# Patient Record
Sex: MALE | Race: WHITE | Employment: OTHER | URBAN - METROPOLITAN AREA
[De-identification: names, ages, dates, MRNs, and addresses within clinical notes are randomized per-mention and may not be internally consistent; named-entity substitution may affect disease eponyms.]

---
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
## 2021-03-30 NOTE — ED TRIAGE NOTES
Patient arrives ambulatory to ED with c/c of sharp left sided rib pain when he touches it, onset a few months. Patient reports he was playing [employer] this evening and may have made it worse.
MODERATE

## 2022-02-19 ENCOUNTER — HOSPITAL ENCOUNTER (EMERGENCY)
Age: 22
Discharge: HOME | End: 2022-02-19
Attending: EMERGENCY MEDICINE

## 2022-02-19 ENCOUNTER — APPOINTMENT (OUTPATIENT)
Dept: GENERAL RADIOLOGY | Age: 22
End: 2022-02-19
Attending: EMERGENCY MEDICINE

## 2022-02-19 VITALS
TEMPERATURE: 98 F | SYSTOLIC BLOOD PRESSURE: 121 MMHG | HEART RATE: 97 BPM | HEIGHT: 70 IN | WEIGHT: 174 LBS | DIASTOLIC BLOOD PRESSURE: 71 MMHG | BODY MASS INDEX: 24.91 KG/M2 | OXYGEN SATURATION: 98 % | RESPIRATION RATE: 16 BRPM

## 2022-02-19 DIAGNOSIS — R07.89 ATYPICAL CHEST PAIN: Primary | ICD-10-CM

## 2022-02-19 DIAGNOSIS — F17.200 SMOKER: ICD-10-CM

## 2022-02-19 LAB
ALBUMIN SERPL-MCNC: 4.4 G/DL (ref 3.4–5)
ALBUMIN/GLOB SERPL: 1.4 {RATIO} (ref 0.8–1.7)
ALP SERPL-CCNC: 90 U/L (ref 45–117)
ALT SERPL-CCNC: 37 U/L (ref 16–61)
ANION GAP SERPL CALC-SCNC: 3 MMOL/L (ref 3–18)
AST SERPL-CCNC: 19 U/L (ref 10–38)
BASOPHILS # BLD: 0 K/UL (ref 0–0.1)
BASOPHILS NFR BLD: 0 % (ref 0–2)
BILIRUB SERPL-MCNC: 0.6 MG/DL (ref 0.2–1)
BUN SERPL-MCNC: 13 MG/DL (ref 7–18)
BUN/CREAT SERPL: 13 (ref 12–20)
CALCIUM SERPL-MCNC: 9.2 MG/DL (ref 8.5–10.1)
CHLORIDE SERPL-SCNC: 106 MMOL/L (ref 100–111)
CO2 SERPL-SCNC: 31 MMOL/L (ref 21–32)
CREAT SERPL-MCNC: 0.97 MG/DL (ref 0.6–1.3)
DIFFERENTIAL METHOD BLD: ABNORMAL
EOSINOPHIL # BLD: 0.1 K/UL (ref 0–0.4)
EOSINOPHIL NFR BLD: 1 % (ref 0–5)
ERYTHROCYTE [DISTWIDTH] IN BLOOD BY AUTOMATED COUNT: 12.6 % (ref 11.6–14.5)
GLOBULIN SER CALC-MCNC: 3.1 G/DL (ref 2–4)
GLUCOSE SERPL-MCNC: 100 MG/DL (ref 74–99)
HCT VFR BLD AUTO: 42.4 % (ref 36–48)
HGB BLD-MCNC: 14.4 G/DL (ref 13–16)
IMM GRANULOCYTES # BLD AUTO: 0 K/UL (ref 0–0.04)
IMM GRANULOCYTES NFR BLD AUTO: 0 % (ref 0–0.5)
LYMPHOCYTES # BLD: 1.5 K/UL (ref 0.9–3.6)
LYMPHOCYTES NFR BLD: 26 % (ref 21–52)
MCH RBC QN AUTO: 29.6 PG (ref 24–34)
MCHC RBC AUTO-ENTMCNC: 34 G/DL (ref 31–37)
MCV RBC AUTO: 87.1 FL (ref 78–100)
MONOCYTES # BLD: 0.4 K/UL (ref 0.05–1.2)
MONOCYTES NFR BLD: 8 % (ref 3–10)
NEUTS SEG # BLD: 3.7 K/UL (ref 1.8–8)
NEUTS SEG NFR BLD: 65 % (ref 40–73)
NRBC # BLD: 0 K/UL (ref 0–0.01)
NRBC BLD-RTO: 0 PER 100 WBC
PLATELET # BLD AUTO: 265 K/UL (ref 135–420)
PMV BLD AUTO: 8.6 FL (ref 9.2–11.8)
POTASSIUM SERPL-SCNC: 3.6 MMOL/L (ref 3.5–5.5)
PROT SERPL-MCNC: 7.5 G/DL (ref 6.4–8.2)
RBC # BLD AUTO: 4.87 M/UL (ref 4.35–5.65)
SODIUM SERPL-SCNC: 140 MMOL/L (ref 136–145)
TROPONIN-HIGH SENSITIVITY: 4 NG/L (ref 0–78)
WBC # BLD AUTO: 5.7 K/UL (ref 4.6–13.2)

## 2022-02-19 PROCEDURE — 99283 EMERGENCY DEPT VISIT LOW MDM: CPT

## 2022-02-19 PROCEDURE — 80053 COMPREHEN METABOLIC PANEL: CPT

## 2022-02-19 PROCEDURE — 85025 COMPLETE CBC W/AUTO DIFF WBC: CPT

## 2022-02-19 PROCEDURE — 93005 ELECTROCARDIOGRAM TRACING: CPT

## 2022-02-19 PROCEDURE — 71045 X-RAY EXAM CHEST 1 VIEW: CPT

## 2022-02-19 PROCEDURE — 84484 ASSAY OF TROPONIN QUANT: CPT

## 2022-02-19 NOTE — ED PROVIDER NOTES
EMERGENCY DEPARTMENT HISTORY AND PHYSICAL EXAM    Date: 2/19/2022  Patient Name: Julián Baptiste    History of Presenting Illness     Chief Complaint   Patient presents with    Chest Pain    Shortness of Breath         History Provided By: Patient    6:53 PM  Julián Baptiste is a 25 y.o. male with PMHX of anxiety, ADHD who presents to the emergency department C/O left-sided chest pain associated with deep inspiration or certain movements, also sometimes feels like he cannot take in a full deep breath. He feels fine right now. Patient states he vapes nicotine and smokes marijuana heavily every day. Occasional EtOH, denies other drugs. No personal or family history of cardiac or pulmonary disease. Pt denies fever, cough, congestion, sore throat, nausea, vomiting, recent long distance travel, history of DVT/PE, calf pain or swelling and any other sxs or complaints. PCP: None    Current Outpatient Medications   Medication Sig Dispense Refill    ibuprofen (MOTRIN) 600 mg tablet Take 1 Tab by mouth every six (6) hours as needed for Pain. Take with food. (Patient not taking: Reported on 2/19/2022) 20 Tab 0    cloNIDine HCL (CATAPRES) 0.1 mg tablet Take  by mouth nightly. (Patient not taking: Reported on 2/19/2022)      methylphenidate HCl (RITALIN) 20 mg tablet Take 20 mg by mouth. (Patient not taking: Reported on 2/19/2022)      sertraline (ZOLOFT) 100 mg tablet Take 150 mg by mouth. (Patient not taking: Reported on 2/19/2022)         Past History     Past Medical History:  Past Medical History:   Diagnosis Date    ADD (attention deficit disorder)     ADHD     Anxiety     Nicotine vapor product user        Past Surgical History:  Past Surgical History:   Procedure Laterality Date    HX ORTHOPAEDIC      right arm       Family History:  History reviewed. No pertinent family history.     Social History:  Social History     Tobacco Use    Smoking status: Never Smoker    Smokeless tobacco: Current User  Tobacco comment: vape   Substance Use Topics    Alcohol use: Yes    Drug use: Yes     Types: Marijuana       Allergies:  No Known Allergies      Review of Systems   Review of Systems   Constitutional: Negative for fever. Respiratory: Positive for shortness of breath. Negative for cough. Cardiovascular: Positive for chest pain. Gastrointestinal: Negative for abdominal pain, nausea and vomiting. All other systems reviewed and are negative. Physical Exam     Vitals:    02/19/22 1722   BP: 121/71   Pulse: 97   Resp: 16   Temp: 98 °F (36.7 °C)   SpO2: 98%   Weight: 78.9 kg (174 lb)   Height: 5' 10\" (1.778 m)     Physical Exam    Vital signs and nursing notes reviewed. CONSTITUTIONAL: Alert. Well-appearing; well-nourished; in no apparent distress. HEAD: Normocephalic; atraumatic. EYES:  Conjunctiva clear. ENT:  Moist mucus membranes. NECK: Supple; FROM without difficulty, non-tender; no cervical lymphadenopathy. CV: Normal S1, S2; no murmurs, rubs, or gallops. No chest wall tenderness. RESPIRATORY: Normal chest excursion with respiration; breath sounds clear and equal bilaterally; no wheezes, rhonchi, or rales. EXT: Normal ROM in all four extremities; non-tender to palpation. No edema or calf tenderness  SKIN: Normal for age and race; warm; dry; good turgor; no apparent lesions or exudate. NEURO: A & O x3. PSYCH:  Mood and affect appropriate. Diagnostic Study Results     Labs -     No results found for this or any previous visit (from the past 12 hour(s)).     Radiologic Studies -   XR CHEST SNGL V   Final Result   No acute process        CT Results  (Last 48 hours)    None        CXR Results  (Last 48 hours)               02/19/22 1743  XR CHEST SNGL V Final result    Impression:  No acute process       Narrative:  EXAM:  AP Portable Chest X-ray 1 view        INDICATION: Shortness of breath       COMPARISON: December 26, 2020       _______________       FINDINGS: Heart and mediastinal contours are within normal limits for portable   radiograph. Lungs are clear of active disease. There are no pleural effusions. No acute osseous findings. ________________                     Medications given in the ED-  Medications - No data to display      Medical Decision Making   I am the first provider for this patient. I reviewed the vital signs, available nursing notes, past medical history, past surgical history, family history and social history. Vital Signs-Reviewed the patient's vital signs. Pulse Oximetry Analysis - 98% on RA       EKG interpretation: (Preliminary)  NSR with sinus arrhythmia, rate 73, no STEMI    Records Reviewed: Nursing Notes      Procedures:  Procedures    ED Course:  6:53 PM   Initial assessment performed. The patients presenting problems have been discussed, and they are in agreement with the care plan formulated and outlined with them. I have encouraged them to ask questions as they arise throughout their visit. Provider Notes (Medical Decision Making): Alena Hadley is a 25 y.o. male presents with a few days of left-sided chest pain with deep inspiration or certain movements. No injury or trauma. Patient is a heavy smoker of both nicotine and marijuana. He appears in no distress, vitals and exam are normal.  He is actually pain-free at this time. His EKG is without ischemic changes labs including troponin are normal.  He is PERC negative with 0 Wells criteria for PE. CXR shows NAP. Patient symptoms are likely related to heavy smoking, possibly pleurisy component. I discussed smoking cessation with him, at least decrease.  consult placed to assist with PCP follow-up as he does not currently have a doctor. Diagnosis and Disposition       DISCHARGE NOTE:    Isabel Medellin's  results have been reviewed with him. He has been counseled regarding his diagnosis, treatment, and plan.   He verbally conveys understanding and agreement of the signs, symptoms, diagnosis, treatment and prognosis and additionally agrees to follow up as discussed. He also agrees with the care-plan and conveys that all of his questions have been answered. I have also provided discharge instructions for him that include: educational information regarding their diagnosis and treatment, and list of reasons why they would want to return to the ED prior to their follow-up appointment, should his condition change. He has been provided with education for proper emergency department utilization. CLINICAL IMPRESSION:    1. Atypical chest pain    2. Smoker        PLAN:  1. D/C Home  2. Discharge Medication List as of 2/19/2022  7:18 PM        3. Follow-up Information     Follow up With Specialties Details Why Contact Info    Jacy Ahn MD Family Medicine Schedule an appointment as soon as possible for a visit   [address] [place] [id number] [phone]      [facility] EMERGENCY DEPT Emergency Medicine  As needed, If symptoms worsen [address] [place] [address]  [phone]        _______________________________      Please note that this dictation was completed with [employer], the computer voice recognition software. Quite often unanticipated grammatical, syntax, homophones, and other interpretive errors are inadvertently transcribed by the computer software. Please disregard these errors. Please excuse any errors that have escaped final proofreading.

## 2022-02-20 LAB
ATRIAL RATE: 73 BPM
CALCULATED P AXIS, ECG09: 13 DEGREES
CALCULATED R AXIS, ECG10: 73 DEGREES
CALCULATED T AXIS, ECG11: 38 DEGREES
DIAGNOSIS, 93000: NORMAL
P-R INTERVAL, ECG05: 172 MS
Q-T INTERVAL, ECG07: 366 MS
QRS DURATION, ECG06: 92 MS
QTC CALCULATION (BEZET), ECG08: 403 MS
VENTRICULAR RATE, ECG03: 73 BPM

## 2022-03-06 ENCOUNTER — HOSPITAL ENCOUNTER (EMERGENCY)
Age: 22
Discharge: HOME | End: 2022-03-06
Attending: EMERGENCY MEDICINE

## 2022-03-06 ENCOUNTER — APPOINTMENT (OUTPATIENT)
Dept: GENERAL RADIOLOGY | Age: 22
End: 2022-03-06
Attending: PHYSICIAN ASSISTANT

## 2022-03-06 VITALS
BODY MASS INDEX: 24.91 KG/M2 | HEART RATE: 105 BPM | OXYGEN SATURATION: 97 % | SYSTOLIC BLOOD PRESSURE: 136 MMHG | RESPIRATION RATE: 16 BRPM | DIASTOLIC BLOOD PRESSURE: 79 MMHG | WEIGHT: 174 LBS | HEIGHT: 70 IN | TEMPERATURE: 96.9 F

## 2022-03-06 DIAGNOSIS — Z20.822 PERSON UNDER INVESTIGATION FOR COVID-19: ICD-10-CM

## 2022-03-06 DIAGNOSIS — J20.9 ACUTE BRONCHITIS WITH BRONCHOSPASM: Primary | ICD-10-CM

## 2022-03-06 LAB
ALBUMIN SERPL-MCNC: 4.2 G/DL (ref 3.4–5)
ALBUMIN/GLOB SERPL: 1.2 {RATIO} (ref 0.8–1.7)
ALP SERPL-CCNC: 108 U/L (ref 45–117)
ALT SERPL-CCNC: 40 U/L (ref 16–61)
ANION GAP SERPL CALC-SCNC: 5 MMOL/L (ref 3–18)
AST SERPL-CCNC: 23 U/L (ref 10–38)
ATRIAL RATE: 86 BPM
BASOPHILS # BLD: 0 K/UL (ref 0–0.1)
BASOPHILS NFR BLD: 1 % (ref 0–2)
BILIRUB SERPL-MCNC: 0.8 MG/DL (ref 0.2–1)
BNP SERPL-MCNC: 15 PG/ML (ref 0–450)
BUN SERPL-MCNC: 7 MG/DL (ref 7–18)
BUN/CREAT SERPL: 8 (ref 12–20)
CALCIUM SERPL-MCNC: 9.1 MG/DL (ref 8.5–10.1)
CALCULATED P AXIS, ECG09: 29 DEGREES
CALCULATED R AXIS, ECG10: 70 DEGREES
CALCULATED T AXIS, ECG11: 41 DEGREES
CHLORIDE SERPL-SCNC: 104 MMOL/L (ref 100–111)
CO2 SERPL-SCNC: 30 MMOL/L (ref 21–32)
CREAT SERPL-MCNC: 0.9 MG/DL (ref 0.6–1.3)
D DIMER PPP FEU-MCNC: <0.27 UG/ML(FEU)
DIAGNOSIS, 93000: NORMAL
DIFFERENTIAL METHOD BLD: ABNORMAL
EOSINOPHIL # BLD: 0.1 K/UL (ref 0–0.4)
EOSINOPHIL NFR BLD: 1 % (ref 0–5)
ERYTHROCYTE [DISTWIDTH] IN BLOOD BY AUTOMATED COUNT: 12.9 % (ref 11.6–14.5)
GLOBULIN SER CALC-MCNC: 3.5 G/DL (ref 2–4)
GLUCOSE SERPL-MCNC: 103 MG/DL (ref 74–99)
HCT VFR BLD AUTO: 46.1 % (ref 36–48)
HGB BLD-MCNC: 15.6 G/DL (ref 13–16)
IMM GRANULOCYTES # BLD AUTO: 0 K/UL (ref 0–0.04)
IMM GRANULOCYTES NFR BLD AUTO: 0 % (ref 0–0.5)
LYMPHOCYTES # BLD: 1 K/UL (ref 0.9–3.6)
LYMPHOCYTES NFR BLD: 15 % (ref 21–52)
MCH RBC QN AUTO: 29.1 PG (ref 24–34)
MCHC RBC AUTO-ENTMCNC: 33.8 G/DL (ref 31–37)
MCV RBC AUTO: 86 FL (ref 78–100)
MONOCYTES # BLD: 1.2 K/UL (ref 0.05–1.2)
MONOCYTES NFR BLD: 18 % (ref 3–10)
NEUTS SEG # BLD: 4.1 K/UL (ref 1.8–8)
NEUTS SEG NFR BLD: 65 % (ref 40–73)
NRBC # BLD: 0 K/UL (ref 0–0.01)
NRBC BLD-RTO: 0 PER 100 WBC
P-R INTERVAL, ECG05: 152 MS
PLATELET # BLD AUTO: 227 K/UL (ref 135–420)
PMV BLD AUTO: 8.8 FL (ref 9.2–11.8)
POTASSIUM SERPL-SCNC: 3.8 MMOL/L (ref 3.5–5.5)
PROT SERPL-MCNC: 7.7 G/DL (ref 6.4–8.2)
Q-T INTERVAL, ECG07: 350 MS
QRS DURATION, ECG06: 88 MS
QTC CALCULATION (BEZET), ECG08: 418 MS
RBC # BLD AUTO: 5.36 M/UL (ref 4.35–5.65)
SARS-COV-2, COV2: NORMAL
SODIUM SERPL-SCNC: 139 MMOL/L (ref 136–145)
TROPONIN-HIGH SENSITIVITY: <3 NG/L (ref 0–78)
VENTRICULAR RATE, ECG03: 86 BPM
WBC # BLD AUTO: 6.4 K/UL (ref 4.6–13.2)

## 2022-03-06 PROCEDURE — 80053 COMPREHEN METABOLIC PANEL: CPT

## 2022-03-06 PROCEDURE — 71045 X-RAY EXAM CHEST 1 VIEW: CPT

## 2022-03-06 PROCEDURE — 85379 FIBRIN DEGRADATION QUANT: CPT

## 2022-03-06 PROCEDURE — U0003 INFECTIOUS AGENT DETECTION BY NUCLEIC ACID (DNA OR RNA); SEVERE ACUTE RESPIRATORY SYNDROME CORONAVIRUS 2 (SARS-COV-2) (CORONAVIRUS DISEASE [COVID-19]), AMPLIFIED PROBE TECHNIQUE, MAKING USE OF HIGH THROUGHPUT TECHNOLOGIES AS DESCRIBED BY CMS-2020-01-R: HCPCS

## 2022-03-06 PROCEDURE — 93005 ELECTROCARDIOGRAM TRACING: CPT

## 2022-03-06 PROCEDURE — 85025 COMPLETE CBC W/AUTO DIFF WBC: CPT

## 2022-03-06 PROCEDURE — 84484 ASSAY OF TROPONIN QUANT: CPT

## 2022-03-06 PROCEDURE — 99285 EMERGENCY DEPT VISIT HI MDM: CPT

## 2022-03-06 PROCEDURE — 83880 ASSAY OF NATRIURETIC PEPTIDE: CPT

## 2022-03-06 RX ORDER — BENZONATATE 100 MG/1
100 CAPSULE ORAL
Qty: 21 CAPSULE | Refills: 0 | Status: SHIPPED | OUTPATIENT
Start: 2022-03-06 | End: 2022-03-13

## 2022-03-06 RX ORDER — ALBUTEROL SULFATE 90 UG/1
2 AEROSOL, METERED RESPIRATORY (INHALATION)
Qty: 1 EACH | Refills: 0 | Status: SHIPPED | OUTPATIENT
Start: 2022-03-06

## 2022-03-06 NOTE — Clinical Note
[facility] [place]  THE [facility] EMERGENCY DEPT  [address] [facility] [address] [phone]    Work/School Note    Date: 3/6/2022     To Whom It May concern:    Clifford Williamson was evaluated by the following provider(s):  Attending Provider: Alexis Schrader MD  Physician Assistant: Deysi West virus is suspected. Per the CDC guidelines we recommend home isolation until the following conditions are all met:    1. At least five days have passed since symptoms first appeared and/or had a close exposure,   2. After home isolation for five days, wearing a mask around others for the next five days,  3. At least 24 have passed since last fever without the use of fever-reducing medications and  4.  Symptoms (eg cough, shortness of breath) have improved      Sincerely,          Stephanie Franklin PA-C

## 2022-03-06 NOTE — ED PROVIDER NOTES
EMERGENCY DEPARTMENT HISTORY AND PHYSICAL EXAM    Date: 3/6/2022  Patient Name: Evonne Harris    History of Presenting Illness     Chief Complaint   Patient presents with    Chest Congestion         History Provided By: Patient    Chief Complaint: chest congestion    HPI(Context):   1:47 PM  Evonne Harris is a 25 y.o. male with PMHX of ADHD, anxiety, nicotine vape use who presents to the emergency department C/O chest congestion. Associated sxs include SOB and cough. Sxs x 4 days. Pt notes sxs improving. No COVID exposures. Pt is vaccinated. Pt denies fever, chills, CP, and any other sxs or complaints. PCP: None    Current Outpatient Medications   Medication Sig Dispense Refill    benzonatate (Tessalon Perles) 100 mg capsule Take 1 Capsule by mouth three (3) times daily as needed for Cough for up to 7 days. 21 Capsule 0    albuterol (PROVENTIL HFA, VENTOLIN HFA, PROAIR HFA) 90 mcg/actuation inhaler Take 2 Puffs by inhalation every four (4) hours as needed for Wheezing or Shortness of Breath. 1 Each 0    inhalational spacing device 1 Each by Does Not Apply route as needed (to be used with albuterol HFA.). Please dispense one adult spacer 1 Each 0       Past History     Past Medical History:  Past Medical History:   Diagnosis Date    ADD (attention deficit disorder)     ADHD     Anxiety     Nicotine vapor product user        Past Surgical History:  Past Surgical History:   Procedure Laterality Date    HX ORTHOPAEDIC      right arm       Family History:  No family history on file. Social History:  Social History     Tobacco Use    Smoking status: Never Smoker    Smokeless tobacco: Current User    Tobacco comment: vape   Substance Use Topics    Alcohol use: Yes    Drug use: Yes     Types: Marijuana       Allergies:  No Known Allergies      Review of Systems   Review of Systems   Constitutional: Negative for chills and fever. HENT: Positive for congestion.     Respiratory: Positive for cough and shortness of breath. Cardiovascular: Negative for chest pain and leg swelling. Musculoskeletal: Negative for myalgias. All other systems reviewed and are negative. Physical Exam     Vitals:    03/06/22 1341   BP: 136/79   Pulse: (!) 105   Resp: 16   Temp: 96.9 °F (36.1 °C)   SpO2: 97%   Weight: 78.9 kg (174 lb)   Height: 5' 10\" (1.778 m)     Physical Exam  Vitals and nursing note reviewed. Constitutional:       General: He is not in acute distress. Appearance: He is well-developed. He is not diaphoretic. Comments: Adult male in NAD. Alert. HENT:      Head: Normocephalic and atraumatic. Right Ear: External ear normal.      Left Ear: External ear normal.      Nose: Congestion present. Eyes:      General: No scleral icterus. Right eye: No discharge. Left eye: No discharge. Conjunctiva/sclera: Conjunctivae normal.   Cardiovascular:      Rate and Rhythm: Normal rate and regular rhythm. Heart sounds: Normal heart sounds. No murmur heard. No friction rub. No gallop. Pulmonary:      Effort: Pulmonary effort is normal. No tachypnea, accessory muscle usage or respiratory distress. Breath sounds: Normal breath sounds. No decreased breath sounds, wheezing, rhonchi or rales. Musculoskeletal:         General: Normal range of motion. Cervical back: Normal range of motion. Right lower leg: No edema. Left lower leg: No edema. Skin:     General: Skin is warm and dry. Neurological:      Mental Status: He is alert and oriented to person, place, and time. Psychiatric:         Judgment: Judgment normal.             Diagnostic Study Results     Labs -   No results found for this or any previous visit (from the past 12 hour(s)).     Radiologic Studies     XR CHEST PORT   Final Result   No acute process        CT Results  (Last 48 hours)    None        CXR Results  (Last 48 hours)               03/06/22 1436  XR CHEST PORT Final result Impression:  No acute process       Narrative:  EXAM:  AP Portable Chest X-ray 1 view        INDICATION: Cough and chest pain shortness of breath       COMPARISON: February 19, 2022       _______________       FINDINGS:  Heart and mediastinal contours are within normal limits for portable   radiograph. Lungs are clear of active disease. There are no pleural effusions. No acute osseous findings. ________________                     Medications given in the ED-  Medications - No data to display      Medical Decision Making   I am the first provider for this patient. I reviewed the vital signs, available nursing notes, past medical history, past surgical history, family history and social history. Vital Signs-Reviewed the patient's vital signs. Pulse Oximetry Analysis - 97% on RA. NORMAL     Records Reviewed: Nursing Notes and Old Medical Records    Provider Notes (Medical Decision Making): PNA, URI, COVID, CHF, asthma/RAD, allergic    Procedures:  Procedures    ED Course:   1:47 PM Initial assessment performed. The patients presenting problems have been discussed, and they are in agreement with the care plan formulated and outlined with them. I have encouraged them to ask questions as they arise throughout their visit. Diagnosis and Disposition       Afebrile. Lungs CTAB. No hypoxia. CXR clear. Labs unremarkable. DDimer neg. Suspect viral process. Will instruct isolation and await SARS-COV-2. Reasons to RTED discussed with pt. All questions answered. Pt feels comfortable going home at this time. Pt expressed understanding and he agrees with plan. 1. Acute bronchitis with bronchospasm    2. Person under investigation for COVID-19        PLAN:  1. D/C Home  2. Discharge Medication List as of 3/6/2022  3:46 PM      START taking these medications    Details   benzonatate (Tessalon Perles) 100 mg capsule Take 1 Capsule by mouth three (3) times daily as needed for Cough for up to 7 days. , Normal, Disp-21 Capsule, R-0      albuterol (PROVENTIL HFA, VENTOLIN HFA, PROAIR HFA) 90 mcg/actuation inhaler Take 2 Puffs by inhalation every four (4) hours as needed for Wheezing or Shortness of Breath., Normal, Disp-1 Each, R-0      inhalational spacing device 1 Each by Does Not Apply route as needed (to be used with albuterol HFA.). Please dispense one adult spacer, Normal, Disp-1 Each, R-0         STOP taking these medications       ibuprofen (MOTRIN) 600 mg tablet Comments:   Reason for Stopping:         cloNIDine HCL (CATAPRES) 0.1 mg tablet Comments:   Reason for Stopping:         methylphenidate HCl (RITALIN) 20 mg tablet Comments:   Reason for Stopping:         sertraline (ZOLOFT) 100 mg tablet Comments:   Reason for Stopping:             3.   Follow-up Information     Follow up With Specialties Details Why Contact Info    your PCP        [facility] EM[facility] DE[facility] Emergency Medicine  As needed, If symptoms worsen [address] [address]  [phone]        _______________________________    Attestations: This note is prepared by Len Allen PA-C.  _______________________________          Please note that this dictation was completed with [employer], the computer voice recognition software. Quite often unanticipated grammatical, syntax, homophones, and other interpretive errors are inadvertently transcribed by the computer software. Please disregard these errors. Please excuse any errors that have escaped final proofreading.

## 2022-03-06 NOTE — ED TRIAGE NOTES
Pt arrives ambulatory to ED with c\o cough, chest congestion x 4 days, pt sts \"I'm better than I was yesterday\"

## 2022-03-07 ENCOUNTER — PATIENT OUTREACH (OUTPATIENT)
Dept: CASE MANAGEMENT | Age: 22
End: 2022-03-07

## 2022-03-07 LAB — SARS-COV-2, NAA: NOT DETECTED

## 2022-03-07 NOTE — PROGRESS NOTES
Ambulatory Care Coordination ED COVID Follow up Call    Challenges to be reviewed by the provider   Additional needs identified to be addressed with provider no  none           Encounter was not routed to provider for escalation. Method of communication with provider : none    Discussed COVID-19 related testing which was pending at this time. Test results were pending. Patient informed of results, if available? pending. Current Symptoms: no new symptoms and no worsening symptoms    Reviewed New or Changed Meds: yes    Do you have what you need at home?  Durable Medical Equipment ordered at discharge: None   Home Health/Outpatient orders at discharge: none    Pulse oximeter? no Discussed and confirmed pulse oximeter discharge instructions and when to notify provider or seek emergency care. Patient education provided: Reviewed appropriate site of care based on symptoms and resources available to patient including: when to seek medical attention. Follow up appointment recommended: no. If no appointment scheduled, scheduling offered: no.  No future appointments. Interventions: Obtained and reviewed discharge summary and/or continuity of care documents  Reviewed discharge instructions, medical action plan and red flags with patient who verbalized understanding. Provided contact information for future needs. Plan for follow-up call in 1-2 days based on severity of symptoms and risk factors.   Plan for next call: follow up test results     Jamie Barajas LPN

## 2022-03-08 ENCOUNTER — PATIENT OUTREACH (OUTPATIENT)
Dept: CASE MANAGEMENT | Age: 22
End: 2022-03-08

## 2022-03-08 NOTE — PROGRESS NOTES
Follow Up Call    Challenges to be reviewed by the provider   Additional needs identified to be addressed with provider: no  none           Encounter was not routed to provider for escalation. Method of communication with provider: none. Contacted the patient by telephone to follow up after ED. Status: improved Covid test negative  Interventions to address identified needs: none at this time    [facility] follow up appointment(s): No future appointments. [facility] follow up appointment(s): as needed   Follow up appointment completed? n/a. Provided contact information for future needs. Plan for follow-up call in 5-7 days based on severity of symptoms and risk factors.   Plan for next call: follow up close     Vishal Harris LPN

## 2022-03-15 ENCOUNTER — PATIENT OUTREACH (OUTPATIENT)
Dept: CASE MANAGEMENT | Age: 22
End: 2022-03-15

## 2022-03-15 NOTE — PROGRESS NOTES
Patient resolved from [address] Transitions episode on 3/15/2022. Patient states he is doing fine. Patient currently reports that the following symptoms have improved:  no worsening symptoms. No further outreach scheduled with this LPN CC. Episode of Care resolved. Patient has this LPN CC contact information if future needs arise.

## 2022-03-18 PROBLEM — R00.2 PALPITATIONS: Status: ACTIVE | Noted: 2020-07-22

## 2022-03-19 PROBLEM — L55.1 SUNBURN OF SECOND DEGREE: Status: ACTIVE | Noted: 2020-07-22

## 2023-04-27 ENCOUNTER — HOSPITAL ENCOUNTER (EMERGENCY)
Facility: HOSPITAL | Age: 23
Discharge: HOME | End: 2023-04-27
Attending: EMERGENCY MEDICINE

## 2023-04-27 VITALS
TEMPERATURE: 97.1 F | HEIGHT: 70 IN | RESPIRATION RATE: 18 BRPM | SYSTOLIC BLOOD PRESSURE: 146 MMHG | DIASTOLIC BLOOD PRESSURE: 89 MMHG | WEIGHT: 165 LBS | BODY MASS INDEX: 23.62 KG/M2 | HEART RATE: 82 BPM | OXYGEN SATURATION: 98 %

## 2023-04-27 DIAGNOSIS — L55.9 SUNBURN: Primary | ICD-10-CM

## 2023-04-27 PROCEDURE — 99283 EMERGENCY DEPT VISIT LOW MDM: CPT

## 2023-04-27 RX ORDER — IBUPROFEN 600 MG/1
600 TABLET ORAL 4 TIMES DAILY PRN
Qty: 360 TABLET | Refills: 1 | Status: SHIPPED | OUTPATIENT
Start: 2023-04-27

## 2023-04-27 RX ORDER — HYDROXYZINE HYDROCHLORIDE 25 MG/1
50 TABLET, FILM COATED ORAL 3 TIMES DAILY PRN
Qty: 21 TABLET | Refills: 0 | Status: SHIPPED | OUTPATIENT
Start: 2023-04-27 | End: 2023-05-04

## 2023-04-27 NOTE — ED PROVIDER NOTES
[facility] EMERGENCY DEPT  EMERGENCY DEPARTMENT ENCOUNTER       Pt Name: Brett Gaytan  MRN: [id number]  [place] 2000  Date of evaluation: 4/27/2023  PCP: None None  Note Started: 1:22 AM 4/27/23     CHIEF COMPLAINT       Chief Complaint   Patient presents with    Sunburn        HISTORY OF PRESENT ILLNESS: 1 or more elements      History From: Patient  HPI Limitations: None  Chronic Conditions: ADHD, anxeity, ADD  Social Determinants affecting Dx or Tx: none      Brett Gaytan is a 26 y.o. male who presents to ED c/o sunburn. Pt c/o itching and peeling to back. Pt was out at beach 5 days ago. Not wearing SPF. Pt has used lotions and benadryl without relief. No reported nausea vomiting     Nursing Notes were all reviewed and agreed with or any disagreements were addressed in the HPI. PAST HISTORY     Past Medical History:  Past Medical History:   Diagnosis Date    ADD (attention deficit disorder)     ADHD     Anxiety     Nicotine vapor product user        Past Surgical History:  Past Surgical History:   Procedure Laterality Date    ORTHOPEDIC SURGERY      right arm       Family History:  No family history on file. Social History:  Social History     Socioeconomic History    Marital status: Single   Tobacco Use    Smoking status: Never    Smokeless tobacco: Current    Tobacco comments:     Quit smoking: vape   Substance and Sexual Activity    Alcohol use: Yes    Drug use: Yes     Types: Marijuana (Weed)       Allergies:  No Known Allergies    CURRENT MEDICATIONS      No current facility-administered medications for this encounter.      Current Outpatient Medications   Medication Sig Dispense Refill    ibuprofen (ADVIL;MOTRIN) 600 MG tablet Take 1 tablet by mouth 4 times daily as needed for Pain 360 tablet 1    hydrOXYzine HCl (ATARAX) 25 MG tablet Take 2 tablets by mouth 3 times daily as needed for Itching 21 tablet 0    albuterol sulfate HFA (PROVENTIL;VENTOLIN;PROAIR) 108 (90 Base) MCG/ACT inhaler Inhale

## 2023-05-05 ENCOUNTER — HOSPITAL ENCOUNTER (EMERGENCY)
Facility: HOSPITAL | Age: 23
Discharge: HOME | End: 2023-05-05
Attending: EMERGENCY MEDICINE

## 2023-05-05 VITALS
HEART RATE: 72 BPM | BODY MASS INDEX: 21.47 KG/M2 | TEMPERATURE: 97.6 F | HEIGHT: 70 IN | SYSTOLIC BLOOD PRESSURE: 123 MMHG | DIASTOLIC BLOOD PRESSURE: 73 MMHG | WEIGHT: 150 LBS | RESPIRATION RATE: 16 BRPM | OXYGEN SATURATION: 100 %

## 2023-05-05 DIAGNOSIS — M79.2 NERVE PAIN: ICD-10-CM

## 2023-05-05 DIAGNOSIS — M54.14 THORACIC NERVE ROOT IMPINGEMENT: Primary | ICD-10-CM

## 2023-05-05 DIAGNOSIS — K08.89 PAIN, DENTAL: ICD-10-CM

## 2023-05-05 PROCEDURE — 99283 EMERGENCY DEPT VISIT LOW MDM: CPT | Performed by: PHYSICIAN ASSISTANT

## 2023-05-05 RX ORDER — METHYLPREDNISOLONE 4 MG/1
TABLET ORAL
Qty: 1 KIT | Refills: 0 | Status: SHIPPED | OUTPATIENT
Start: 2023-05-05

## 2023-05-05 RX ORDER — AMOXICILLIN 500 MG/1
500 TABLET, FILM COATED ORAL 2 TIMES DAILY
Qty: 20 TABLET | Refills: 0 | Status: SHIPPED | OUTPATIENT
Start: 2023-05-05 | End: 2023-05-15

## 2023-05-05 ASSESSMENT — PAIN SCALES - GENERAL: PAINLEVEL_OUTOF10: 10

## 2023-05-05 ASSESSMENT — PAIN - FUNCTIONAL ASSESSMENT: PAIN_FUNCTIONAL_ASSESSMENT: 0-10

## 2023-05-05 NOTE — DISCHARGE INSTRUCTIONS
Look up wall angels exercises for back pain relief. Take medications as prescribed. Make appointment with dentist and focus on posture.

## 2023-05-05 NOTE — ED PROVIDER NOTES
[facility] EMERGENCY DEPT  EMERGENCY DEPARTMENT ENCOUNTER       Pt Name: Feli Bashir  MRN: [id number]  [place] 2000  Date of evaluation: 5/5/2023  Provider: Cece Torres PA-C   PCP: None None  Note Started: 2:41 PM 5/5/23     CHIEF COMPLAINT       Chief Complaint   Patient presents with    Back Pain    Dental Pain        HISTORY OF PRESENT ILLNESS: 1 or more elements      History From: Patient  HPI Limitations: None     Feli Bashir is a 26 y.o. male who presents to the emergency department with a chief complaint of right sided thoracic back pain onset 1 week ago. Patient describes the sensation as a sharp tingling sensation of the area as well as an itchiness to the skin. He does report getting sunburned 2 weeks ago to his back. He was given hydroxyzine for his sunburn but he states that the medication has not helped. He is also tried taking ibuprofen without much relief. He does admit to poor posture for several years. He is also complaining of dental pain to the left lower area which is generally chronic for him and he has known dental decay. He is scheduled to see the dentist in the next couple of weeks. No trouble swallowing, fever, injury, heavy lifting. Nursing Notes were all reviewed and agreed with or any disagreements were addressed in the HPI. PAST HISTORY     Past Medical History:  Past Medical History:   Diagnosis Date    ADD (attention deficit disorder)     ADHD     Anxiety     Nicotine vapor product user        Past Surgical History:  Past Surgical History:   Procedure Laterality Date    ORTHOPEDIC SURGERY      right arm       Family History:  History reviewed. No pertinent family history.     Social History:  Social History     Socioeconomic History    Marital status: Single     Spouse name: None    Number of children: None    Years of education: None    Highest education level: None   Tobacco Use    Smoking status: Never    Smokeless tobacco: Current    Tobacco comments:

## 2023-05-23 ENCOUNTER — HOSPITAL ENCOUNTER (EMERGENCY)
Facility: HOSPITAL | Age: 23
Discharge: HOME | End: 2023-05-23
Attending: EMERGENCY MEDICINE

## 2023-05-23 VITALS
TEMPERATURE: 97 F | HEART RATE: 70 BPM | SYSTOLIC BLOOD PRESSURE: 134 MMHG | BODY MASS INDEX: 22.9 KG/M2 | WEIGHT: 160 LBS | OXYGEN SATURATION: 99 % | RESPIRATION RATE: 18 BRPM | HEIGHT: 70 IN | DIASTOLIC BLOOD PRESSURE: 88 MMHG

## 2023-05-23 DIAGNOSIS — K02.9 PAIN DUE TO DENTAL CARIES: Primary | ICD-10-CM

## 2023-05-23 DIAGNOSIS — S02.5XXA CLOSED FRACTURE OF TOOTH, INITIAL ENCOUNTER: ICD-10-CM

## 2023-05-23 PROCEDURE — 99283 EMERGENCY DEPT VISIT LOW MDM: CPT

## 2023-05-23 PROCEDURE — 6370000000 HC RX 637 (ALT 250 FOR IP): Performed by: PHYSICIAN ASSISTANT

## 2023-05-23 RX ORDER — PENICILLIN V POTASSIUM 500 MG/1
500 TABLET ORAL 4 TIMES DAILY
Qty: 28 TABLET | Refills: 0 | Status: SHIPPED | OUTPATIENT
Start: 2023-05-23 | End: 2023-05-30

## 2023-05-23 RX ORDER — DIPHENHYDRAMINE HCL 12.5MG/5ML
25 LIQUID (ML) ORAL ONCE
Status: COMPLETED | OUTPATIENT
Start: 2023-05-23 | End: 2023-05-23

## 2023-05-23 RX ADMIN — DIPHENHYDRAMINE HYDROCHLORIDE 25 MG: 25 SOLUTION ORAL at 01:40

## 2023-05-23 RX ADMIN — BENZOCAINE, BUTAMBEN, AND TETRACAINE HYDROCHLORIDE 1 SPRAY: .028; .004; .004 AEROSOL, SPRAY TOPICAL at 01:40

## 2023-05-23 ASSESSMENT — PAIN - FUNCTIONAL ASSESSMENT: PAIN_FUNCTIONAL_ASSESSMENT: 0-10

## 2023-05-23 ASSESSMENT — PAIN SCALES - GENERAL: PAINLEVEL_OUTOF10: 10

## 2023-05-23 NOTE — DISCHARGE INSTRUCTIONS
Finish antibiotics as prescribed, do not take more than 600 mg of ibuprofen every 6-8 hours, you can use \"dental balls\" for pain as needed.   Make an appointment with your dentist.  Return to the ER for any fever or facial swelling

## 2023-05-23 NOTE — ED TRIAGE NOTES
Patient to ED for reports of LEFT tooth and jaw pain.  Patient states he was seen for similar symptoms a few months ago but is worried that the infection has spread to his jaw

## 2023-05-23 NOTE — ED NOTES
Patient given discharge papers. Patient understanding of discharge.  Patient ambulatory out of ED       Thai Johns RN  05/23/23 [id number]

## 2023-05-23 NOTE — ED PROVIDER NOTES
[facility] EMERGENCY DEPT  EMERGENCY DEPARTMENT ENCOUNTER       Pt Name: Alfredo Davis  MRN: [id number]  [place] 2000  Date of evaluation: 5/23/2023  Provider: Reanna Nova PA-C   PCP: None None  Note Started: 1:10 AM 5/23/23     CHIEF COMPLAINT       Chief Complaint   Patient presents with    Dental Pain    Jaw Pain        HISTORY OF PRESENT ILLNESS: 1 or more elements      History From: Patient  HPI Limitations: None     Alfredo Davis is a 26 y.o. male who presents to the emergency department with a chief complaint of left lower dental pain, intermittent/chronic, with redness of his gums. Patient reports that his pain radiates into the left lower jaw. Patient has known dental caries and fractured teeth of the left lower molars. He states that he canceled his last dental appointment but knows that he needs to follow-up with a dentist and plans to reschedule his appointment tomorrow. His last round of antibiotics was 2 months ago. Patient has been taking ibuprofen for the pain. He denies facial swelling, fever, chills, trouble swallowing, drainage from the area, bleeding. Nursing Notes were all reviewed and agreed with or any disagreements were addressed in the HPI. PAST HISTORY     Past Medical History:  Past Medical History:   Diagnosis Date    ADD (attention deficit disorder)     ADHD     Anxiety     Nicotine vapor product user        Past Surgical History:  Past Surgical History:   Procedure Laterality Date    ORTHOPEDIC SURGERY      right arm       Family History:  History reviewed. No pertinent family history. Social History:  Social History     Socioeconomic History    Marital status: Single     Spouse name: None    Number of children: None    Years of education: None    Highest education level: None   Tobacco Use    Smoking status: Never    Smokeless tobacco: Current    Tobacco comments:     Quit smoking: vape   Substance and Sexual Activity    Alcohol use: Yes    Drug use:  Yes

## 2023-07-10 VITALS
RESPIRATION RATE: 16 BRPM | SYSTOLIC BLOOD PRESSURE: 134 MMHG | HEIGHT: 70 IN | WEIGHT: 165 LBS | OXYGEN SATURATION: 99 % | HEART RATE: 95 BPM | DIASTOLIC BLOOD PRESSURE: 76 MMHG | BODY MASS INDEX: 23.62 KG/M2 | TEMPERATURE: 98.2 F

## 2023-07-10 ASSESSMENT — PAIN DESCRIPTION - DESCRIPTORS: DESCRIPTORS: ACHING

## 2023-07-10 ASSESSMENT — PAIN DESCRIPTION - ORIENTATION: ORIENTATION: LEFT

## 2023-07-10 ASSESSMENT — PAIN - FUNCTIONAL ASSESSMENT: PAIN_FUNCTIONAL_ASSESSMENT: 0-10

## 2023-07-10 ASSESSMENT — PAIN SCALES - GENERAL: PAINLEVEL_OUTOF10: 8

## 2023-07-10 ASSESSMENT — PAIN DESCRIPTION - LOCATION: LOCATION: TEETH

## 2023-07-11 ENCOUNTER — HOSPITAL ENCOUNTER (EMERGENCY)
Facility: HOSPITAL | Age: 23
Discharge: LEFT BEFORE DISCHARGE | End: 2023-07-11

## 2023-07-11 ENCOUNTER — HOSPITAL ENCOUNTER (EMERGENCY)
Facility: HOSPITAL | Age: 23
Discharge: HOME | End: 2023-07-11

## 2023-07-11 VITALS
DIASTOLIC BLOOD PRESSURE: 92 MMHG | HEART RATE: 84 BPM | OXYGEN SATURATION: 99 % | RESPIRATION RATE: 15 BRPM | SYSTOLIC BLOOD PRESSURE: 146 MMHG | TEMPERATURE: 97.9 F

## 2023-07-11 DIAGNOSIS — Z72.0 NICOTINE VAPOR PRODUCT USER: ICD-10-CM

## 2023-07-11 DIAGNOSIS — J02.9 ACUTE PHARYNGITIS, UNSPECIFIED ETIOLOGY: ICD-10-CM

## 2023-07-11 DIAGNOSIS — R51.9 RIGHT FACIAL PAIN: Primary | ICD-10-CM

## 2023-07-11 PROCEDURE — 99283 EMERGENCY DEPT VISIT LOW MDM: CPT

## 2023-07-11 RX ORDER — KETOROLAC TROMETHAMINE 10 MG/1
10 TABLET, FILM COATED ORAL EVERY 6 HOURS PRN
Qty: 12 TABLET | Refills: 0 | Status: SHIPPED | OUTPATIENT
Start: 2023-07-11

## 2023-07-11 RX ORDER — AMOXICILLIN 500 MG/1
500 CAPSULE ORAL 2 TIMES DAILY
Qty: 20 CAPSULE | Refills: 0 | Status: SHIPPED | OUTPATIENT
Start: 2023-07-11 | End: 2023-07-21

## 2023-07-11 NOTE — DISCHARGE INSTRUCTIONS
DENTAL CLINICS FOR FOLLOW UP:    Dr. Amarilis Brenner, DDS   [address], [facility]  [address]:  [address] [facility], [address]  [phone]  Blu Tapia, and Extractions    [facility], [address]  [phone]  Blu Tapia, and Extractions    [address] [facility]  [address], [facility]  [phone]  Fillings, Cleaning, and [address] [facility]  [address], [address] [facility]  [phone]    [facility] CEDE[facility]  [facility] [address], [address] [facility]  [phone]  Ages 3-18, if attending [facility]  [address] [facility]  [phone]  Extractions, Rad[place], [facility]    [facility]  [address], [address]  [phone]  Oral Surgery - $70 required  Blu Tapia, Extractions - $100 Required    [facility]   [address] [facility] [address] [facility], [address] [facility]  [phone]  [facility] Only    [address] and Juarez Mueller, [facility] [address]  Dental Clinic Open September to June

## 2023-07-11 NOTE — ED PROVIDER NOTES
[facility] EMERGENCY DEPT  EMERGENCY DEPARTMENT ENCOUNTER       Pt Name: Larry Girard  MRN: [id number]  [address] 2000  Date of evaluation: 7/11/2023  PCP: None None  Note Started: 4:46 PM 7/11/23     CHIEF COMPLAINT       Chief Complaint   Patient presents with    Sore Throat    Dental Pain        HISTORY OF PRESENT ILLNESS: 1 or more elements      History From: Patient  HPI Limitations: None  Chronic Conditions: ADD, anxiety, marijuana use, nicotine vape use. Social Determinants affecting Dx or Tx: none      Larry Girard is a 26 y.o. male who presents to ED c/o left lower dental pain. Pain is aching with some swelling. Pt also notes sore throat. Sxs x 2 days. Pt has not seen dentist. He notes hx of problems with this tooth. No fever, cough, congestion. Pt is active marijuana user and vapes nicotine     Nursing Notes were all reviewed and agreed with or any disagreements were addressed in the HPI. PAST HISTORY     Past Medical History:  Past Medical History:   Diagnosis Date    ADD (attention deficit disorder)     ADHD     Anxiety     Nicotine vapor product user        Past Surgical History:  Past Surgical History:   Procedure Laterality Date    ORTHOPEDIC SURGERY      right arm       Family History:  No family history on file. Social History:  Social History     Socioeconomic History    Marital status: Single   Tobacco Use    Smoking status: Never    Smokeless tobacco: Current    Tobacco comments:     Quit smoking: vape   Substance and Sexual Activity    Alcohol use: Yes    Drug use: Yes     Types: Marijuana (Weed)       Allergies:  No Known Allergies    CURRENT MEDICATIONS      No current facility-administered medications for this encounter. Current Outpatient Medications   Medication Sig Dispense Refill    ketorolac (TORADOL) 10 MG tablet Take 1 tablet by mouth every 6 hours as needed for Pain Take with food. Do NOT take with other NSAIDs (ibuprofen, naproxen).  12 tablet 0    amoxicillin

## 2023-07-11 NOTE — ED NOTES
Pt has been called many times with no response. Pt no longer in waiting room.       Tanna Bonilla RN  07/11/23 4947

## 2023-07-11 NOTE — ED TRIAGE NOTES
Pt arrives alert + oriented reports dental pain x \"a few months\"   Pt states he has been seen here for it before hasnt f/u with dentist.

## 2023-10-11 ENCOUNTER — APPOINTMENT (OUTPATIENT)
Facility: HOSPITAL | Age: 23
End: 2023-10-11

## 2023-10-11 ENCOUNTER — HOSPITAL ENCOUNTER (EMERGENCY)
Facility: HOSPITAL | Age: 23
Discharge: HOME | End: 2023-10-11

## 2023-10-11 VITALS
RESPIRATION RATE: 22 BRPM | HEIGHT: 69 IN | WEIGHT: 174 LBS | TEMPERATURE: 98.3 F | HEART RATE: 100 BPM | BODY MASS INDEX: 25.77 KG/M2 | OXYGEN SATURATION: 99 % | DIASTOLIC BLOOD PRESSURE: 99 MMHG | SYSTOLIC BLOOD PRESSURE: 135 MMHG

## 2023-10-11 DIAGNOSIS — S69.91XA INJURY OF RIGHT HAND, INITIAL ENCOUNTER: ICD-10-CM

## 2023-10-11 DIAGNOSIS — S60.221A CONTUSION OF RIGHT HAND, INITIAL ENCOUNTER: Primary | ICD-10-CM

## 2023-10-11 DIAGNOSIS — Z23 TETANUS-DIPHTHERIA (TD) VACCINATION: ICD-10-CM

## 2023-10-11 DIAGNOSIS — R03.0 ELEVATED BLOOD PRESSURE READING IN OFFICE WITHOUT DIAGNOSIS OF HYPERTENSION: ICD-10-CM

## 2023-10-11 PROCEDURE — 90471 IMMUNIZATION ADMIN: CPT | Performed by: NURSE PRACTITIONER

## 2023-10-11 PROCEDURE — 99284 EMERGENCY DEPT VISIT MOD MDM: CPT

## 2023-10-11 PROCEDURE — 90714 TD VACC NO PRESV 7 YRS+ IM: CPT | Performed by: NURSE PRACTITIONER

## 2023-10-11 PROCEDURE — 73130 X-RAY EXAM OF HAND: CPT

## 2023-10-11 PROCEDURE — 6360000002 HC RX W HCPCS: Performed by: NURSE PRACTITIONER

## 2023-10-11 PROCEDURE — 6370000000 HC RX 637 (ALT 250 FOR IP): Performed by: NURSE PRACTITIONER

## 2023-10-11 RX ORDER — ACETAMINOPHEN 500 MG
1000 TABLET ORAL
Status: COMPLETED | OUTPATIENT
Start: 2023-10-11 | End: 2023-10-11

## 2023-10-11 RX ADMIN — CLOSTRIDIUM TETANI TOXOID ANTIGEN (FORMALDEHYDE INACTIVATED) AND CORYNEBACTERIUM DIPHTHERIAE TOXOID ANTIGEN (FORMALDEHYDE INACTIVATED) 0.5 ML: 5; 2 INJECTION, SUSPENSION INTRAMUSCULAR at 21:27

## 2023-10-11 RX ADMIN — ACETAMINOPHEN 1000 MG: 500 TABLET ORAL at 21:21

## 2023-10-11 ASSESSMENT — PAIN SCALES - GENERAL
PAINLEVEL_OUTOF10: 6
PAINLEVEL_OUTOF10: 9

## 2023-10-11 ASSESSMENT — PAIN - FUNCTIONAL ASSESSMENT: PAIN_FUNCTIONAL_ASSESSMENT: 0-10
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
## 2023-10-12 NOTE — DISCHARGE INSTRUCTIONS
RICE care as discussed  Use Ace wrap to affected extremity  Elevate as much as possible  May use ice up to 20 minutes at a time, do not place ice directly on skin  May use OTC Tylenol or ibuprofen according to package directions for pain  Up with PCM/Ortho if pain does not improve in 1 to 2 weeks  Return to care for new or worsening symptoms to include pale or cool extremity, loss of sensation (loosen Ace wrap first and see if this resolves), marked increase in swelling or other concerns

## 2023-11-23 ENCOUNTER — HOSPITAL ENCOUNTER (EMERGENCY)
Facility: HOSPITAL | Age: 23
Discharge: LEFT BEFORE DISCHARGE | End: 2023-11-24
Attending: EMERGENCY MEDICINE

## 2023-11-23 VITALS
OXYGEN SATURATION: 98 % | TEMPERATURE: 97.4 F | HEART RATE: 78 BPM | DIASTOLIC BLOOD PRESSURE: 85 MMHG | BODY MASS INDEX: 24.77 KG/M2 | SYSTOLIC BLOOD PRESSURE: 127 MMHG | RESPIRATION RATE: 16 BRPM | HEIGHT: 70 IN | WEIGHT: 173 LBS

## 2023-11-23 ASSESSMENT — PAIN - FUNCTIONAL ASSESSMENT: PAIN_FUNCTIONAL_ASSESSMENT: 0-10

## 2023-11-23 ASSESSMENT — PAIN DESCRIPTION - ORIENTATION: ORIENTATION: UPPER

## 2023-11-23 ASSESSMENT — PAIN DESCRIPTION - LOCATION: LOCATION: TEETH

## 2023-11-23 ASSESSMENT — PAIN SCALES - GENERAL: PAINLEVEL_OUTOF10: 8

## 2023-11-24 ENCOUNTER — HOSPITAL ENCOUNTER (EMERGENCY)
Facility: HOSPITAL | Age: 23
Discharge: HOME | End: 2023-11-24
Attending: EMERGENCY MEDICINE

## 2023-11-24 VITALS
OXYGEN SATURATION: 100 % | HEART RATE: 97 BPM | TEMPERATURE: 98 F | WEIGHT: 174 LBS | RESPIRATION RATE: 18 BRPM | BODY MASS INDEX: 24.97 KG/M2 | DIASTOLIC BLOOD PRESSURE: 81 MMHG | SYSTOLIC BLOOD PRESSURE: 118 MMHG

## 2023-11-24 DIAGNOSIS — K04.7 DENTAL INFECTION: Primary | ICD-10-CM

## 2023-11-24 PROCEDURE — 99283 EMERGENCY DEPT VISIT LOW MDM: CPT

## 2023-11-24 RX ORDER — IBUPROFEN 800 MG/1
800 TABLET ORAL EVERY 8 HOURS PRN
Qty: 30 TABLET | Refills: 0 | Status: SHIPPED | OUTPATIENT
Start: 2023-11-24

## 2023-11-24 RX ORDER — AMOXICILLIN AND CLAVULANATE POTASSIUM 875; 125 MG/1; MG/1
1 TABLET, FILM COATED ORAL 2 TIMES DAILY
Qty: 20 TABLET | Refills: 0 | Status: SHIPPED | OUTPATIENT
Start: 2023-11-24 | End: 2023-12-04

## 2023-11-24 RX ORDER — ACETAMINOPHEN 500 MG
1000 TABLET ORAL EVERY 6 HOURS PRN
Qty: 30 TABLET | Refills: 0 | Status: SHIPPED | OUTPATIENT
Start: 2023-11-24

## 2023-11-24 ASSESSMENT — PAIN - FUNCTIONAL ASSESSMENT: PAIN_FUNCTIONAL_ASSESSMENT: 0-10

## 2023-11-24 NOTE — ED PROVIDER NOTES
REVIEWED: Nursing Notes    Is this patient to be included in the SEP-1 core measure? No Exclusion criteria - the patient is NOT to be included for SEP-1 Core Measure due to: 2+ SIRS criteria are not met    MEDICATIONS ADMINISTERED IN THE ED:  Medications - No data to display         None    Medical Decision Making     SCREENING TOOLS:  None    CLINICAL MANAGEMENT TOOLS:  Not Applicable    DDX: Dental infection, dental pain    DISCUSSION:  This appears to be a moderate condition. This appears to be an acute condition. 26 y.o. male with upper dental swelling. In evaluation of the above differential diagnosis, consideration was given to the following tests and treatments. Likely dental infection, will place on antibiotics and pain medications. He can follow-up with his dentist, call Monday for an appointment. The decision to perform testing and results were discussed with the patient. I discussed each of these tests and considerations with the patient. They agree with the plan of discharge. ADDITIONAL CONSIDERATIONS:  None    Critical Care Time:     None    Raúl Morales MD    Diagnosis and Disposition     DISPOSITION Decision To Discharge 11/24/2023 10:30:17 AM    DISCHARGE NOTE:  Temi Fournier's  results have been reviewed with him. He has been counseled regarding his diagnosis, treatment, and plan. He verbally conveys understanding and agreement of the signs, symptoms, diagnosis, treatment and prognosis and additionally agrees to follow up as discussed. He also agrees with the care-plan and conveys that all of his questions have been answered. I have also provided discharge instructions for him that include: educational information regarding their diagnosis and treatment, and list of reasons why they would want to return to the ED prior to their follow-up appointment, should his condition change. He has been provided with education for proper emergency department utilization.      CLINICAL

## 2023-11-24 NOTE — DISCHARGE INSTRUCTIONS
Take all antibiotics as prescribed. Contact your dentist as soon as possible for follow-up. Return to the ED for worsening symptoms or for other concerns. You may additionally use ice packs for pain control. Tylenol and ibuprofen can be taken at the same time for pain.

## 2023-11-24 NOTE — ED NOTES
Resting in room. No changes in condition. Advised pt MD has clicked on chart.       Vivek Swanson RN  11/24/23 2788

## 2024-05-01 ENCOUNTER — APPOINTMENT (OUTPATIENT)
Facility: HOSPITAL | Age: 24
End: 2024-05-01

## 2024-05-01 ENCOUNTER — HOSPITAL ENCOUNTER (EMERGENCY)
Facility: HOSPITAL | Age: 24
Discharge: HOME | End: 2024-05-01
Attending: EMERGENCY MEDICINE

## 2024-05-01 VITALS
SYSTOLIC BLOOD PRESSURE: 134 MMHG | HEART RATE: 88 BPM | WEIGHT: 168 LBS | RESPIRATION RATE: 16 BRPM | DIASTOLIC BLOOD PRESSURE: 74 MMHG | OXYGEN SATURATION: 97 % | HEIGHT: 70 IN | TEMPERATURE: 97.6 F | BODY MASS INDEX: 24.05 KG/M2
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
## 2024-05-01 DIAGNOSIS — S20.211A RIB CONTUSION, RIGHT, INITIAL ENCOUNTER: ICD-10-CM

## 2024-05-01 DIAGNOSIS — M25.511 ACUTE PAIN OF RIGHT SHOULDER: ICD-10-CM

## 2024-05-01 PROCEDURE — 71101 X-RAY EXAM UNILAT RIBS/CHEST: CPT

## 2024-05-01 PROCEDURE — 99283 EMERGENCY DEPT VISIT LOW MDM: CPT

## 2024-05-01 PROCEDURE — 73030 X-RAY EXAM OF SHOULDER: CPT

## 2024-05-01 RX ORDER — METHOCARBAMOL 750 MG/1
750 TABLET, FILM COATED ORAL 4 TIMES DAILY
Qty: 24 TABLET | Refills: 0 | Status: SHIPPED | OUTPATIENT
Start: 2024-05-01 | End: 2024-05-07

## 2024-05-01 RX ORDER — IBUPROFEN 800 MG/1
800 TABLET ORAL EVERY 8 HOURS PRN
Qty: 30 TABLET | Refills: 0 | Status: SHIPPED | OUTPATIENT
Start: 2024-05-01

## 2024-05-01 ASSESSMENT — PAIN DESCRIPTION - LOCATION: LOCATION: SHOULDER;RIB CAGE

## 2024-05-01 ASSESSMENT — PAIN - FUNCTIONAL ASSESSMENT: PAIN_FUNCTIONAL_ASSESSMENT: 0-10

## 2024-05-01 ASSESSMENT — PAIN SCALES - GENERAL: PAINLEVEL_OUTOF10: 8

## 2024-05-01 NOTE — ED TRIAGE NOTES
Patient reports it was dark and missed step and fell down 13 steps last night. Reports has rright shoulder and neck pain and right rib pain. States it hurts to take a deep breath

## 2024-05-01 NOTE — ED PROVIDER NOTES
[facility] EMERGENCY DEPT  EMERGENCY DEPARTMENT ENCOUNTER    Patient Name: Darío Fournier  MRN: [id number]  YOB: 2000  Provider: Steve Mathew MD  PCP: None, None (Inactive)   Time/Date of evaluation: 1:35 PM EDT on 5/1/24    History of Presenting Illness     Chief Complaint   Patient presents with    Fall    Shoulder Pain    Rib Pain (injury)       History Provided by: Patient   History is limited by: Nothing    HISTORY (Narative):   Darío Fournier is a 24 y.o. male with a PMHX of ADHD  who presents to the emergency department (room 8) by POV C/O nonsyncopal fall down 13 steps onset last night.  Patient states that he missed a step in the dark and fell down 13 steps.  Associated sxs include right shoulder pain, right rib pain, neck pain, pleurisy. Patient denies LOC, blood thinner use or any other sxs or complaints.  C-collar placed in triage.    Nursing Notes were all reviewed and agreed with or any disagreements were addressed in the HPI.    Past History     PAST MEDICAL HISTORY:  Past Medical History:   Diagnosis Date    ADD (attention deficit disorder)     ADHD     Anxiety     Nicotine vapor product user        PAST SURGICAL HISTORY:  Past Surgical History:   Procedure Laterality Date    ORTHOPEDIC SURGERY      right arm       FAMILY HISTORY:  No family history on file.    SOCIAL HISTORY:  Social History     Tobacco Use    Smoking status: Never    Smokeless tobacco: Never    Tobacco comments:     Quit smoking: vape   Vaping Use    Vaping Use: Every day   Substance Use Topics    Alcohol use: Not Currently    Drug use: Yes     Types: Marijuana (Weed)     Comment: daily       MEDICATIONS:  No current facility-administered medications for this encounter.     Current Outpatient Medications   Medication Sig Dispense Refill    ibuprofen (ADVIL;MOTRIN) 800 MG tablet Take 1 tablet by mouth every 8 hours as needed for Pain (Patient not taking: Reported on 5/1/2024) 30 tablet 0    acetaminophen (TYLENOL)

## 2025-02-09 ENCOUNTER — APPOINTMENT (OUTPATIENT)
Facility: HOSPITAL | Age: 25
End: 2025-02-09

## 2025-02-09 ENCOUNTER — HOSPITAL ENCOUNTER (EMERGENCY)
Facility: HOSPITAL | Age: 25
Discharge: HOME | End: 2025-02-09

## 2025-02-09 VITALS
RESPIRATION RATE: 16 BRPM | HEIGHT: 70 IN | TEMPERATURE: 98.4 F | BODY MASS INDEX: 26.48 KG/M2 | HEART RATE: 86 BPM | SYSTOLIC BLOOD PRESSURE: 133 MMHG | WEIGHT: 185 LBS | DIASTOLIC BLOOD PRESSURE: 86 MMHG | OXYGEN SATURATION: 100 %

## 2025-02-09 DIAGNOSIS — J06.9 VIRAL URI WITH COUGH: Primary | ICD-10-CM

## 2025-02-09 LAB
FLUAV RNA SPEC QL NAA+PROBE: NOT DETECTED
FLUBV RNA SPEC QL NAA+PROBE: NOT DETECTED
SARS-COV-2 RNA RESP QL NAA+PROBE: NOT DETECTED
SOURCE: NORMAL

## 2025-02-09 PROCEDURE — 99284 EMERGENCY DEPT VISIT MOD MDM: CPT

## 2025-02-09 PROCEDURE — 71046 X-RAY EXAM CHEST 2 VIEWS: CPT

## 2025-02-09 PROCEDURE — 87636 SARSCOV2 & INF A&B AMP PRB: CPT

## 2025-02-09 PROCEDURE — 6360000002 HC RX W HCPCS: Performed by: NURSE PRACTITIONER

## 2025-02-09 PROCEDURE — 6370000000 HC RX 637 (ALT 250 FOR IP): Performed by: NURSE PRACTITIONER

## 2025-02-09 RX ORDER — DEXAMETHASONE 4 MG/1
8 TABLET ORAL
Status: COMPLETED | OUTPATIENT
Start: 2025-02-09 | End: 2025-02-09

## 2025-02-09 RX ORDER — IBUPROFEN 600 MG/1
600 TABLET, FILM COATED ORAL
Status: COMPLETED | OUTPATIENT
Start: 2025-02-09 | End: 2025-02-09

## 2025-02-09 RX ADMIN — IBUPROFEN 600 MG: 600 TABLET, FILM COATED ORAL at 15:32

## 2025-02-09 RX ADMIN — DEXAMETHASONE 8 MG: 4 TABLET ORAL at 15:32

## 2025-02-09 ASSESSMENT — PAIN DESCRIPTION - LOCATION: LOCATION: OTHER (COMMENT)

## 2025-02-09 ASSESSMENT — PAIN SCALES - GENERAL: PAINLEVEL_OUTOF10: 6

## 2025-02-09 ASSESSMENT — PAIN - FUNCTIONAL ASSESSMENT: PAIN_FUNCTIONAL_ASSESSMENT: 0-10

## 2025-02-09 NOTE — ED TRIAGE NOTES
Pt ambulatory to ED c/o influenza symptoms that began 5 days ago. Pt reports productive cough, fatigue, body aches.

## 2025-02-09 NOTE — DISCHARGE INSTRUCTIONS
Increase fluid intake and rest  May use over-the-counter cough and cold medications according to package directions, include Mucinex for productive cough  Do not take additional tylenol if included in cough and cold medication  May use Cepacol lozenges according to package directions for sore throat  May use salt water gargles, warm tea with honey  May use over-the-counter sinus rinses for sinus congestion  Return to care for new or worsening symptoms to include shortness of breath, inability to swallow secretions or other concerning symptoms

## 2025-02-09 NOTE — ED PROVIDER NOTES
[facility]  EMERGENCY DEPARTMENT ENCOUNTER       Pt Name: Darío Fournier  MRN: [id number]  Birthdate 2000  Date of evaluation: 2/9/2025  PCP: None, None  Note Started: 5:26 PM 2/9/25     CHIEF COMPLAINT       Chief Complaint   Patient presents with    Influenza        HISTORY OF PRESENT ILLNESS: 1 or more elements      History From: Patient  HPI Limitations: None  Chronic Conditions: ADHD, anxiety  Social Determinants affecting Dx or Tx: None       Darío Fournier is a 25 y.o. male who presents to ED c/o flulike symptoms for 4 days.  Patient reports productive cough, fatigue, body aches.  No chest pain or shortness of breath, no nausea or vomiting, no noted pharyngitis.     Nursing Notes were all reviewed and agreed with or any disagreements were addressed in the HPI.    PAST HISTORY     Past Medical History:  Past Medical History:   Diagnosis Date    ADD (attention deficit disorder)     ADHD     Anxiety     Nicotine vapor product user        Past Surgical History:  Past Surgical History:   Procedure Laterality Date    ORTHOPEDIC SURGERY      right arm       Family History:  History reviewed. No pertinent family history.    Social History:  Social History     Socioeconomic History    Marital status: Single     Spouse name: None    Number of children: None    Years of education: None    Highest education level: None   Tobacco Use    Smoking status: Never    Smokeless tobacco: Never    Tobacco comments:     Quit smoking: vape   Vaping Use    Vaping status: Every Day   Substance and Sexual Activity    Alcohol use: Yes     Comment: daily    Drug use: Yes     Types: Marijuana (Weed)     Comment: daily       Allergies:  No Known Allergies    CURRENT MEDICATIONS      No current facility-administered medications for this encounter.     Current Outpatient Medications   Medication Sig Dispense Refill    ibuprofen (ADVIL;MOTRIN) 800 MG tablet Take 1 tablet by mouth every 8 hours as needed

## 2025-02-09 NOTE — DISCHARGE INSTR - COC
Continuity of Care Form    Patient Name: Darío Fournier   :  2000  MRN:  [id number]    Admit date:  2025  Discharge date:  ***    Code Status Order: No Order   Advance Directives:   Advance Care Flowsheet Documentation             Admitting Physician:  No admitting provider for patient encounter.  PCP: None, None    Discharging Nurse: ***  Discharging Hospital Unit/Room#: QT2/Q2  Discharging Unit Phone Number: ***    Emergency Contact:   Extended Emergency Contact Information  Primary Emergency Contact: Te Lee  Home Phone: [phone]  Relation: Friend  Preferred language: English   needed? No    Past Surgical History:  Past Surgical History:   Procedure Laterality Date    ORTHOPEDIC SURGERY      right arm       Immunization History:   Immunization History   Administered Date(s) Administered    TD 5LF, TENIVAC, (age 7y+), IM, 0.5mL 10/11/2023       Active Problems:  Patient Active Problem List   Diagnosis Code    Palpitations R00.2    Sunburn of second degree L55.1       Isolation/Infection:   Isolation            No Isolation          Patient Infection Status       Infection Onset Added Last Indicated Last Indicated By Review Planned Expiration Resolved Resolved By    None active    Resolved    COVID-19 20 Conversion, Epic   20 Conversion, Epic                       Nurse Assessment:  Last Vital Signs: BP (!) 139/104   Pulse 99   Temp 98.4 °F (36.9 °C) (Oral)   Resp 16   Ht 1.778 m (5' 10\")   Wt 83.9 kg (185 lb)   SpO2 100%   BMI 26.54 kg/m²     Last documented pain score (0-10 scale): Pain Level: 6  Last Weight:   Wt Readings from Last 1 Encounters:   25 83.9 kg (185 lb)     Mental Status:  {IP PT MENTAL STATUS:}    IV Access:  { FLAVIA IV ACCESS:833503257}    Nursing Mobility/ADLs:  Walking   {CHP DME ADLs:076330170}  Transfer  {CHP DME ADLs:282007492}  Bathing  {CHP DME ADLs:997480418}  Dressing  {CHP DME ADLs:599960077}  Toileting